# Patient Record
Sex: MALE | Race: WHITE | NOT HISPANIC OR LATINO | Employment: FULL TIME | URBAN - METROPOLITAN AREA
[De-identification: names, ages, dates, MRNs, and addresses within clinical notes are randomized per-mention and may not be internally consistent; named-entity substitution may affect disease eponyms.]

---

## 2024-09-12 ENCOUNTER — HOSPITAL ENCOUNTER (EMERGENCY)
Facility: HOSPITAL | Age: 64
Discharge: HOME | End: 2024-09-12
Payer: OTHER MISCELLANEOUS

## 2024-09-12 ENCOUNTER — APPOINTMENT (OUTPATIENT)
Dept: RADIOLOGY | Facility: HOSPITAL | Age: 64
End: 2024-09-12
Payer: OTHER MISCELLANEOUS

## 2024-09-12 VITALS
SYSTOLIC BLOOD PRESSURE: 116 MMHG | TEMPERATURE: 98.6 F | HEIGHT: 72 IN | HEART RATE: 86 BPM | OXYGEN SATURATION: 94 % | WEIGHT: 195 LBS | RESPIRATION RATE: 16 BRPM | DIASTOLIC BLOOD PRESSURE: 84 MMHG | BODY MASS INDEX: 26.41 KG/M2

## 2024-09-12 DIAGNOSIS — S61.215A LACERATION OF LEFT RING FINGER WITHOUT FOREIGN BODY WITHOUT DAMAGE TO NAIL, INITIAL ENCOUNTER: ICD-10-CM

## 2024-09-12 PROCEDURE — 12001 RPR S/N/AX/GEN/TRNK 2.5CM/<: CPT

## 2024-09-12 PROCEDURE — 99283 EMERGENCY DEPT VISIT LOW MDM: CPT

## 2024-09-12 PROCEDURE — 73140 X-RAY EXAM OF FINGER(S): CPT | Mod: LT

## 2024-09-12 PROCEDURE — 250N000013 HC RX MED GY IP 250 OP 250 PS 637

## 2024-09-12 RX ORDER — ACETAMINOPHEN 500 MG
1000 TABLET ORAL ONCE
Status: COMPLETED | OUTPATIENT
Start: 2024-09-12 | End: 2024-09-12

## 2024-09-12 RX ADMIN — ACETAMINOPHEN 1000 MG: 500 TABLET ORAL at 18:03

## 2024-09-12 ASSESSMENT — COLUMBIA-SUICIDE SEVERITY RATING SCALE - C-SSRS
6. HAVE YOU EVER DONE ANYTHING, STARTED TO DO ANYTHING, OR PREPARED TO DO ANYTHING TO END YOUR LIFE?: NO
1. IN THE PAST MONTH, HAVE YOU WISHED YOU WERE DEAD OR WISHED YOU COULD GO TO SLEEP AND NOT WAKE UP?: NO
2. HAVE YOU ACTUALLY HAD ANY THOUGHTS OF KILLING YOURSELF IN THE PAST MONTH?: NO

## 2024-09-12 NOTE — DISCHARGE INSTRUCTIONS
A laceration, or cut, is an open wound through the skin.  These can be due to many different causes and can come in many different forms.  The depth, location, and cause of your wound, among multiple other factors (including your preference) affected the treatment choices we made today. Today I used used skin adhesive to close your wound.     Skin adhesive glues such as Dermabond are sometimes used instead of stitches to close cuts. When the adhesive dries, it forms a film that holds the edges of the cut together. Skin adhesives are sometimes called  liquid stitches.     Aftercare instructions:  Keep the wound clean and dry for the first 12-24 hours.  You can shower with a skin adhesive in place, but do not soak the area in water. Do not go swimming. Be sure to gently dry the area after it gets wet  Do not scratch, rub, or pick at the adhesive.  Do not put tape directly over the adhesive.  Do not use petroleum jelly or antibiotic ointment on the skin glue. This can cause the glue to prematurely slough off.  While your wound is healing, avoid any activity that could cause your wound to reopen.  Please be mindful when it comes to activities.  Also, avoid unnescessary, copious bacteria exposure-for example, swimming in an ocean or hot tub, or wearing shoes or gloves over a wound for a long period of time (which promotes bacterial growth).    Healing expectations:  You may have some swelling, color changes, and bloody crusting on or around the wound for 2 or 3 days. This can be normal, and doesn t mean the glue isn t working.  The glue will naturally slough off in about 5-7 days. At this time, scar tissue will be forming under the surface of the wound and your body will do the rest of the work of healing.  It s very important to recognize that the most vulnerable time for a wound are the days right after the closure material has been removed.  This is when a wound is most susceptible to dehiscence (opening up).  Thus,  be careful to avoid activities that could put your wound under unneccesary and excessive tension forces.  Any wound that passes through the full thickness of the skin will likely cause a permanent scar.  This scar may be very prominent at first, but tends to gradually improve over the course of about a year. Protect your healing wound from excessive sunlight, which can darken the final appearance of a scar.        Call your doctor, or seek immediate medical care in an emergency department if:  Your wound is causing new pain, or the pain gets worse.  Some discomfort is normal with a wound, but do not ignore pain that is getting worse instead of better. You could have an infection.  The cut starts to bleed, and blood soaks through the bandage. Oozing small amounts of blood is normal.  The skin near the cut is cold or pale or changes color.  You have tingling, weakness, or numbness.  You have trouble moving the area near the cut.  You have increased pain, swelling, warmth, or redness around the cut, which could be a sign of infection.  There are red streaks leading from the cut, or there is pus draining from the cut.  You have a fever.

## 2024-09-12 NOTE — ED PROVIDER NOTES
EMERGENCY DEPARTMENT ENCOUNTER      NAME: Errol Baez  AGE: 64 year old male  YOB: 1960  MRN: [id number]  EVALUATION DATE & TIME: 09/12/24 4:29 PM    PCP: No primary care provider on file.    ED PROVIDER: Casi Bose PA-C      CHIEF COMPLAINT:  Laceration      FINAL IMPRESSION:  1. Laceration of left ring finger without foreign body without damage to nail, initial encounter          ED COURSE & MEDICAL DECISION MAKING:  Pertinent Labs & Imaging studies reviewed. (See chart for details)         The patient is a 64 year old male presenting to the emergency department for evaluation of a laceration to the palmar aspect of the middle phalanx of his left fourth digit sustained prior to arrival when he accidentally pinched his finger between a piece of plywood with resulting laceration. Reports tingling and numbness. No other pain or injuries. Took Tylenol prior to arrival. Not on blood thinning medication.    Initial vital signs reviewed and all within normal limits.  On exam, patient is clinically well-appearing and nontoxic-appearing in no acute distress.  Simple 1.75 cm laceration to palmar aspect of middle phalanx of left fourth digit, no bony tenderness or deformity otherwise.  Distal CMS intact.    Considered a broad differential including simple laceration, fracture, dislocation, foreign body, open fracture, tendon or ligamentous injury. Tetanus is up to date.  Although have relatively low suspicion for bony injury, cannot definitively rule this and/or foreign body out. Exam not suggestive of surrounding cellulitis. Distal CMS intact on exam. Discussed options for workup and management with the patient. We have agreed on Xray and repair with dermabond. X-ray of finger shows no acute displaced fracture.    I administered a digital block with excellent effect. Wound was copiously irrigated by nursing staff and patient tolerated this well. Wound was then closed with dermabond, splinted  to support integrity of laceration repair, and appropriately dressed by RN. Please see procedure note for details.     No indications for antibiotics at this time. Discussed at-home management, follow up, and indications to return to the emergency department. See discharge summary. The patient verbalized understanding and is comfortable with this plan.     At the conclusion of the encounter I discussed the results of all of the tests and the disposition. The questions were answered. The patient or family acknowledged understanding and was agreeable with the care plan.       ED COURSE:  4:31 PM I met and introduced myself to the patient. I gathered initial history and performed an initial physical exam. We discussed options and plan for diagnostics and treatment here in the ED.  4:59 PM I performed digital block.   5:38 PM I performed laceration repair.  I discussed the plan for discharge with the patient, and patient is agreeable. We discussed supportive cares at home and reasons for return to the ER including new or worsening symptoms - all questions and concerns addressed to the best of my ability. Strict return precautions discussed. Patient to be discharged by RN.      Medical Decision Making  Obtained supplemental history:Supplemental history obtained?: No  Reviewed external records: External records reviewed?: No  Care impacted by chronic illness:N/A  Care significantly affected by social determinants of health:N/A  Did you consider but not order tests?: Work up considered but not performed and documented in chart, if applicable  Did you interpret images independently?: Independent interpretation of ECG and images noted in documentation, when applicable.  Consultation discussion with other provider:Did you involve another provider (consultant, MH, pharmacy, etc.)?: No  Discharge. No recommendations on prescription strength medication(s). N/A.    Not Applicable     CRITICAL CARE:  Not  applicable      MEDICATIONS GIVEN IN THE EMERGENCY:  Medications   acetaminophen (TYLENOL) tablet 1,000 mg (1,000 mg Oral $Given 9/12/24 4936)       NEW PRESCRIPTIONS STARTED AT TODAY'S ER VISIT  There are no discharge medications for this patient.         =================================================================    HPI    Patient information was obtained from: patient     Use of Intrepreter: N/A        Errol Baez is a 64 year old male with no pertinent medical history who presents to the emergency department for evaluation of finger laceration.     The patient reports that he cut his left ring finger around 10:30 AM. He said he was holding a piece of plywood, when his finger got pinched between and his finger ripped open. He cleaned the area out, it bled initially with bleeding controlled now. Patient reports the area feels numb and tingly, but he is able to feel touch. Nothing else injured, and he took tylenol 4 hours ago. No use of blood thinners. His last tetanus was in 2022.       PAST MEDICAL HISTORY:  History reviewed. No pertinent past medical history.    PAST SURGICAL HISTORY:  History reviewed. No pertinent surgical history.    CURRENT MEDICATIONS:    None       ALLERGIES:  No Known Allergies    FAMILY HISTORY:  History reviewed. No pertinent family history.    SOCIAL HISTORY:        VITALS:    First Vitals:  No data found.      No data found.        PHYSICAL EXAM  VITAL SIGNS: /84   Pulse 86   Temp 98.6  F (37  C) (Oral)   Resp 16   Ht 1.829 m (6')   Wt 88.5 kg (195 lb)   SpO2 94%   BMI 26.45 kg/m     GENERAL: Awake, alert, answering questions appropriately.  HEENT: Moist mucous membranes. Posterior oropharynx clear with no erythema, no exudate. No tonsillar hypertrophy. Uvula midline. Tolerating secretions, no drooling.    SPEECH:  Easy to understand speech, Normal volume and malcom. Normal phonation.  PULMONARY: No respiratory distress, Breathing comfortably on room air. Lungs  clear to auscultation bilaterally.  CARDIOVASCULAR: Regular rate and rhythm, radial pulses present, symmetric, and normal.  ABDOMINAL: Soft, Nondistended, Nontender, No rebound or guarding, No palpable masses.  EXTREMITIES: Extremities are warm and well perfused.   Left hand: Warm and well perfused. Brisk capillary refill. No erythema or swelling of hand. 1.75 cm linear laceration to lateral aspect of volar surface of left fourth digit. No focal bony tenderness to palpation, no tenderness to palpation in anatomic snuffbox. Able to flex and extend all digits and thumb.  Able to hyperextend fingers with palm flat on table. + isolated extension/flexion of MCP/PIP/DIP joints in digits 2-5 and MCP/IP of thumb. Strength 5/5 throughout.  No rotational deformity of fingers in a resting position or when making a fist. SILT in median/ulnar/radial nerve distributions.  Able to abduct fingers, extend wrist and give ok sign against resistance.  NEUROLOGIC: Moving all extremities spontaneously.   SKIN: Exposed areas of skin warm, dry, no rashes.  PSYCH: Normal mood and affect.           RADIOLOGY/LAB:  Reviewed all pertinent imaging. Please see official radiology report.  All pertinent labs reviewed and interpreted.  Results for orders placed or performed during the hospital encounter of 09/12/24   Fingers XR, 2-3 views, left    Impression    IMPRESSION: Anatomic alignment left ring finger. No acute displaced left ring finger fracture is identified. Possible soft tissue laceration palmar aspect of the left ring finger at the level of the middle phalangeal head. No radiopaque soft tissue   foreign body. Mild degenerative change at the IP joints of the ring finger.           EKG:  None      PROCEDURES:  PROCEDURE: Digital Block   INDICATIONS: Laceration   PROCEDURE PROVIDER: Casi Bose PA-C   SITE: Left ring finger (4th digit)   MEDICATION: 2 mL of 1% Lidocaine without epinephrine   NOTE: The skin overlying the site for  injection was prepped with chlorhexidine.  Needle was inserted in a standard three point injection pattern.  Each time the area was aspirated and there was no return of blood.  I then injected the medication at the base of the digit.  The patient had good response to the procedure   COMPLICATIONS: Patient tolerated procedure well, without complication      PROCEDURE: Laceration Repair   INDICATIONS: Laceration   PROCEDURE PROVIDER: Casi Bose PA-C   SITE: Left ring finger    TYPE/SIZE: simple, clean, and no foreign body visualized  1.75 cm (total length)   FUNCTIONAL ASSESSMENT: Distal sensation, circulation, and motor intact   MEDICATION: Digital block   PREPARATION: irrigation with Normal saline   DEBRIDEMENT: no debridement and wound explored, no foreign body found   CLOSURE:  Superficial layer closed with Dermabond (medical glue)    Total number of sutures/staples placed: None            SELINA, Laila Briseno, am serving as a scribe to document services personally performed by Casi Bose PA-C, based on my observation and the provider's statements to me. I, Casi Bose PA-C attest that Laila Briseno is acting in a scribe capacity, has observed my performance of the services and has documented them in accordance with my direction.         Casi Bose PA-C  Emergency Medicine  [facility] EMERGENCY DEPARTMENT  [facility] [facility] [id number] [phone]  Dept: [phone]     Casi Bose PA-C  09/23/24 0259

## 2024-09-12 NOTE — ED TRIAGE NOTES
Patient presents to ED for left ring finger laceration.  Occurred about 6 hours ago when a piece of wood slipped and hit finger.  Last took advil at 4pm.  Bleeding controlled.     Triage Assessment (Adult)       Row Name 09/12/24 1547          Triage Assessment    Airway WDL WDL        Respiratory WDL    Respiratory WDL WDL        Skin Circulation/Temperature WDL    Skin Circulation/Temperature WDL WDL        Cardiac WDL    Cardiac WDL WDL        Peripheral/Neurovascular WDL    Peripheral Neurovascular WDL WDL        Cognitive/Neuro/Behavioral WDL    Cognitive/Neuro/Behavioral WDL WDL